# Patient Record
Sex: MALE | Race: WHITE | NOT HISPANIC OR LATINO | Employment: FULL TIME | URBAN - METROPOLITAN AREA
[De-identification: names, ages, dates, MRNs, and addresses within clinical notes are randomized per-mention and may not be internally consistent; named-entity substitution may affect disease eponyms.]

---

## 2024-05-28 ENCOUNTER — OFFICE VISIT (OUTPATIENT)
Dept: NEUROSURGERY | Facility: CLINIC | Age: 59
End: 2024-05-28
Payer: COMMERCIAL

## 2024-05-28 VITALS
RESPIRATION RATE: 18 BRPM | DIASTOLIC BLOOD PRESSURE: 82 MMHG | BODY MASS INDEX: 29.02 KG/M2 | HEART RATE: 75 BPM | WEIGHT: 174.2 LBS | TEMPERATURE: 98.4 F | HEIGHT: 65 IN | SYSTOLIC BLOOD PRESSURE: 126 MMHG

## 2024-05-28 DIAGNOSIS — S39.012S LUMBAR SPINE STRAIN, SEQUELA: ICD-10-CM

## 2024-05-28 DIAGNOSIS — M47.816 LUMBAR SPONDYLOSIS: Primary | ICD-10-CM

## 2024-05-28 DIAGNOSIS — M47.12 CERVICAL SPONDYLOSIS WITH MYELOPATHY: ICD-10-CM

## 2024-05-28 PROCEDURE — 99204 OFFICE O/P NEW MOD 45 MIN: CPT | Performed by: NEUROLOGICAL SURGERY

## 2024-05-28 RX ORDER — ESOMEPRAZOLE MAGNESIUM 40 MG/1
40 CAPSULE, DELAYED RELEASE ORAL
COMMUNITY

## 2024-05-28 RX ORDER — CYCLOBENZAPRINE HCL 10 MG
10 TABLET ORAL 2 TIMES DAILY PRN
COMMUNITY
Start: 2022-10-17

## 2024-05-28 RX ORDER — BISMUTH SUBSALICYLATE 262 MG
1 TABLET,CHEWABLE ORAL DAILY
COMMUNITY

## 2024-05-28 RX ORDER — ACETAMINOPHEN 500 MG
2000 TABLET ORAL DAILY
COMMUNITY

## 2024-05-28 RX ORDER — ALBUTEROL SULFATE 90 UG/1
2 AEROSOL, METERED RESPIRATORY (INHALATION) EVERY 6 HOURS PRN
COMMUNITY
Start: 2023-03-12

## 2024-05-28 RX ORDER — SIMVASTATIN 20 MG/1
20 TABLET, FILM COATED ORAL NIGHTLY
COMMUNITY

## 2024-05-28 RX ORDER — PREDNISONE 20 MG/1
20 TABLET ORAL DAILY PRN
COMMUNITY
Start: 2022-12-08

## 2024-05-28 RX ORDER — MICONAZOLE NITRATE 2 %
2 CREAM (GRAM) TOPICAL DAILY
COMMUNITY
Start: 2024-03-11

## 2024-05-28 RX ORDER — CHOLECALCIFEROL (VITAMIN D3) 25 MCG
1000 TABLET ORAL DAILY
COMMUNITY
Start: 2023-12-08

## 2024-05-28 RX ORDER — HYDROCHLOROTHIAZIDE 12.5 MG/1
12.5 CAPSULE ORAL
COMMUNITY

## 2024-05-28 RX ORDER — POTASSIUM CHLORIDE 750 MG/1
10 TABLET, EXTENDED RELEASE ORAL DAILY
COMMUNITY
Start: 2022-12-22

## 2024-05-28 ASSESSMENT — PAIN SCALES - GENERAL: PAINLEVEL: 5

## 2024-05-28 NOTE — PROGRESS NOTES
[facility] [place]  Department of Neurological Surgery  New Patient Visit    History of Present Illness:  Juan Cedeno is a 58 y.o. year old male who presents to the spine clinic with complaints of back pain especially on the right side extending from the interscapular area down to his low back.  He also has bilateral SI joint tenderness and pain.  He has significant numbness on the lateral aspect of his right thigh.  This started after his left hip replacement.  Initially the lateral thigh was painful and they talked about it being related to his intertrochanteric ligament but it is now just numb.  It seems to follow the fascia lotta very closely.  He denies loss of bowel or bladder function.  He is not having any neck pain.  He did have 2 artificial disks placed at C5-6 and C6-7 in 2020.  He is not complaining of any pain in his neck since.  He does however noticed that he is having trouble with fine motor coordination with his hands.  He has not fallen.  He had his first injection in his low back and he is not sure its helped yet.    Prior Spine Surgeries: Two-level arthroplasty in the cervical spine    Physical Therapy: Yes but mostly be focused on his hip and only a little bit on the low back pain.  We can consider more physical therapy or his primary doctor candidate since he is an hour and a half out of town.  Diabetic: No  Osteoporosis: No  Patient's BMI is Body mass index is 28.99 kg/m².    14/14 systems reviewed and negative other than what is listed in the history of present illness    Patient Active Problem List   Diagnosis    Lumbar spondylosis    Lumbar spine strain, sequela    Cervical spondylosis with myelopathy     No past medical history on file.  Past Surgical History:   Procedure Laterality Date    CERVICAL DISC SURGERY  01/20/2020    c5-6-7 disc replacement     Social History     Tobacco Use    Smoking status: Every Day     Current packs/day: 1.00     Types: Cigarettes     Smokeless tobacco: Never   Substance Use Topics    Alcohol use: Never     family history includes Hypertension in his mother.    Current Outpatient Medications:     albuterol 90 mcg/actuation inhaler, Inhale 2 puffs every 6 hours if needed., Disp: , Rfl:     cholecalciferol (Vitamin D-3) 25 MCG (1000 UT) tablet, Take 1 tablet (1,000 Units) by mouth once daily., Disp: , Rfl:     cholecalciferol (Vitamin D3) 50 mcg (2,000 unit) capsule, Take 1 capsule (50 mcg) by mouth once daily., Disp: , Rfl:     cyclobenzaprine (Flexeril) 10 mg tablet, Take 1 tablet (10 mg) by mouth 2 times a day as needed., Disp: , Rfl:     esomeprazole (NexIUM) 40 mg DR capsule, Take 1 capsule (40 mg) by mouth once daily in the morning. Take before meals., Disp: , Rfl:     hydroCHLOROthiazide (Microzide) 12.5 mg capsule, Take 1 capsule (12.5 mg) by mouth once daily., Disp: , Rfl:     multivitamin tablet, Take 1 tablet by mouth once daily., Disp: , Rfl:     nicotine polacrilex (Nicorette) 2 mg gum, Chew 1 each (2 mg) once daily., Disp: , Rfl:     potassium chloride CR 10 mEq ER tablet, Take 1 tablet (10 mEq) by mouth once daily., Disp: , Rfl:     predniSONE (Deltasone) 20 mg tablet, Take 1 tablet (20 mg) by mouth once daily as needed., Disp: , Rfl:     simvastatin (Zocor) 20 mg tablet, Take 1 tablet (20 mg) by mouth once daily at bedtime., Disp: , Rfl:   Allergies   Allergen Reactions    Tramadol Hives and Swelling     Tongue swelling        Physical Examination      General: NAD, AOx 3,  no aphasia or dysarthria, normal fund of knowledge  Cranial Nerves II-XII: VFF, PERRL, EOMI, Face Symm, Facial SILT, Palate/Tongue midline and symmetric  Motor: 5/5 Throughout all extremities,  No drift, no dysmetria on finger to nose, Dupuytren's contraction on his right hand for the fifth finger and developing in the fourth finger  Sensation: SILT and PP throughout all extremities except on the lateral aspect of his right thigh especially up by the  trochanter  DTRS: 2+ Throughout, right Hoffmans negative on the left no clonus  Gait is nonantalgic he could toe and heel walk without difficulty he has a negative Romberg    Results    I personally reviewed and interpreted the imaging results which included the MRI of his lumbar spine which shows some degenerative changes at the lower levels with some early foraminal stenosis but no canal compromise whatsoever.  Excellent alignment.    Assessment and Plan:    Juan Cedeno is a 58 y.o. year old male who presents to the spine clinic with significant low back pain with some right leg numbness.  He is status post two-level arthroplasty.  I think he shows some signs of myelopathy.  I think we should get plain x-rays of his cervical spine and we should follow it up with another MRI since he is tells me he has not had any studies since his cervical surgery.  He is willing to come back and talk to me further about these results.      I have reviewed all prior documentation and reviewed the electronic medical record since admission. I have personally have reviewed all advanced imaging not just the reports and used my interpretation as documented as the relevant findings. I have reviewed the risks and benefits of all treatment recommendations listed in this note with the patient and family. I spent a total of 45 minutes in service to this patient's care during this date of service.      The above clinical summary has been dictated with voice recognition software. It has not been proofread for grammatical errors, typographical mistakes, or other semantic inconsistencies.    Thank you for visiting our office today. It was our pleasure to take part in your healthcare.     Do not hesitate to call with any questions regarding your plan of care after leaving. My office can be reached at [phone] M-F 8am-4pm.     To clinicians, thank you very much for this kind referral. It is a privilege to partner with you in the care of your  patients. My office would be delighted to assist you with any further consultations or with questions regarding the plan of care outlined. Do not hesitate to call the office or contact me directly.     Sincerely,    Ephraim Do MD, FAANS, FACS  Board Certified Neurological Surgeon  , Department of Neurological Surgery  [facility] [employer]    [facility]  [address]  [facility] [address]  [address]  [address] [facility]  [address] [place] [address]    Phone: [phone]  Fax: [phone]

## 2024-06-15 ENCOUNTER — HOSPITAL ENCOUNTER (OUTPATIENT)
Dept: RADIOLOGY | Facility: HOSPITAL | Age: 59
Discharge: HOME | End: 2024-06-15
Payer: COMMERCIAL

## 2024-06-15 DIAGNOSIS — M47.12 CERVICAL SPONDYLOSIS WITH MYELOPATHY: ICD-10-CM

## 2024-06-15 PROCEDURE — 72141 MRI NECK SPINE W/O DYE: CPT | Performed by: RADIOLOGY

## 2024-06-15 PROCEDURE — 72052 X-RAY EXAM NECK SPINE 6/>VWS: CPT | Performed by: RADIOLOGY

## 2024-06-15 PROCEDURE — 72141 MRI NECK SPINE W/O DYE: CPT

## 2024-06-15 PROCEDURE — 72052 X-RAY EXAM NECK SPINE 6/>VWS: CPT

## 2024-07-03 ENCOUNTER — APPOINTMENT (OUTPATIENT)
Dept: NEUROSURGERY | Facility: CLINIC | Age: 59
End: 2024-07-03
Payer: COMMERCIAL

## 2024-07-03 VITALS
WEIGHT: 174 LBS | HEART RATE: 74 BPM | SYSTOLIC BLOOD PRESSURE: 158 MMHG | HEIGHT: 65 IN | TEMPERATURE: 98.2 F | DIASTOLIC BLOOD PRESSURE: 88 MMHG | BODY MASS INDEX: 28.99 KG/M2

## 2024-07-03 DIAGNOSIS — S13.100S: ICD-10-CM

## 2024-07-03 DIAGNOSIS — M47.12 CERVICAL SPONDYLOSIS WITH MYELOPATHY: Primary | ICD-10-CM

## 2024-07-03 PROCEDURE — 99214 OFFICE O/P EST MOD 30 MIN: CPT | Performed by: NEUROLOGICAL SURGERY

## 2024-07-03 ASSESSMENT — PATIENT HEALTH QUESTIONNAIRE - PHQ9
SUM OF ALL RESPONSES TO PHQ9 QUESTIONS 1 & 2: 0
2. FEELING DOWN, DEPRESSED OR HOPELESS: NOT AT ALL
1. LITTLE INTEREST OR PLEASURE IN DOING THINGS: NOT AT ALL

## 2024-07-03 ASSESSMENT — PAIN SCALES - GENERAL: PAINLEVEL: 5

## 2024-07-03 NOTE — PROGRESS NOTES
[facility] [place]  Department of Neurological Surgery  Established Patient Visit    History of Present Illness  Juan Cedeno is a 58 y.o. year old male who presents to the spine clinic in follow up with his new MRI of the cervical spine.  He is having some trouble since the last time I saw him with some incontinence of stool.  It does not sound like it is related to his spine it sounds like he might have a GI issue.  But what is concerning is that he is having some cramping and tremulousness in his hands when he tries to work with them for example on computer cables and the like.  I showed him his MRI and we also went over the flexion-extension cervical x-rays.  The MRI shows that there is a disc osteophyte complex at the C4-5 level causing some effacement of the cord and some stenosis of the foramen at that level.  The 2 levels of arthroplasty show some artifact and it makes it difficult to see the the extent of cord effacement but I can see that the foramen on the right side are more significantly affected than the left side at each level.  If and when he gets a point where he cannot take it any longer I think he is going to need a 3 level operation.  The x-rays show that he has hypermobility at the C5-6 level and what looks like almost no movement whatsoever at the C6-7 level.  I told him that I would like to get a CAT scan of his cervical spine and bring him back with his wife to go over the recommendation.  I talked him today about a 3 level operation I would go through the old incision.  I would remove the C5-6 and the C6-7 arthroplasty.  I would then perform a 3 level anterior cervical disc excision with interbody fusion and plate fixation at C4-5, C5-6, and C6-7.  I went over the operation with him in detail. We discussed risks,(these include but are not limited to - bleeding, infection, paralysis, muscle weakness, CSF leak, bowel or bladder dysfunction, incomplete resolution of pain or  numbness, DVT/PE, heart attack, stroke, and other unforeseen medical and anesthesia complications) benefits, and outcome possibilities as well as the alternatives to this form of therapy. He understands and would like to proceed.    Patient's BMI is Body mass index is 28.96 kg/m².    14/14 systems reviewed and negative other than what is listed in the history of present illness    Patient Active Problem List   Diagnosis    Lumbar spondylosis    Lumbar spine strain, sequela    Cervical spondylosis with myelopathy    Cervical subluxation, sequela     No past medical history on file.  Past Surgical History:   Procedure Laterality Date    CERVICAL DISC SURGERY  01/20/2020    c5-6-7 disc replacement     Social History     Tobacco Use    Smoking status: Every Day     Current packs/day: 1.00     Types: Cigarettes    Smokeless tobacco: Never   Substance Use Topics    Alcohol use: Never     family history includes Hypertension in his mother.    Current Outpatient Medications:     albuterol 90 mcg/actuation inhaler, Inhale 2 puffs every 6 hours if needed., Disp: , Rfl:     cholecalciferol (Vitamin D-3) 25 MCG (1000 UT) tablet, Take 1 tablet (1,000 Units) by mouth once daily., Disp: , Rfl:     cholecalciferol (Vitamin D3) 50 mcg (2,000 unit) capsule, Take 1 capsule (50 mcg) by mouth once daily., Disp: , Rfl:     cyclobenzaprine (Flexeril) 10 mg tablet, Take 1 tablet (10 mg) by mouth 2 times a day as needed., Disp: , Rfl:     esomeprazole (NexIUM) 40 mg DR capsule, Take 1 capsule (40 mg) by mouth once daily in the morning. Take before meals., Disp: , Rfl:     hydroCHLOROthiazide (Microzide) 12.5 mg capsule, Take 1 capsule (12.5 mg) by mouth once daily., Disp: , Rfl:     multivitamin tablet, Take 1 tablet by mouth once daily., Disp: , Rfl:     nicotine polacrilex (Nicorette) 2 mg gum, Chew 1 each (2 mg) once daily., Disp: , Rfl:     potassium chloride CR 10 mEq ER tablet, Take 1 tablet (10 mEq) by mouth once daily., Disp: , Rfl:      predniSONE (Deltasone) 20 mg tablet, Take 1 tablet (20 mg) by mouth once daily as needed., Disp: , Rfl:     simvastatin (Zocor) 20 mg tablet, Take 1 tablet (20 mg) by mouth once daily at bedtime., Disp: , Rfl:   Allergies   Allergen Reactions    Tramadol Hives and Swelling     Tongue swelling          Results:  I personally reviewed and interpreted the imaging results which included the plain x-rays including flexion and extension views of his neck and the new MRI of the cervical spine are described above.    Assessment and Plan:      Juan Cedeno is a 58 y.o. year old male who presents to the spine clinic in follow up with his new MRI of the cervical spine.  He is having some trouble since the last time I saw him with some incontinence of stool.  It does not sound like it is related to his spine it sounds like he might have a GI issue.  But what is concerning is that he is having some cramping and tremulousness in his hands when he tries to work with them for example on computer cables and the like.  I showed him his MRI and we also went over the flexion-extension cervical x-rays.  The MRI shows that there is a disc osteophyte complex at the C4-5 level causing some effacement of the cord and some stenosis of the foramen at that level.  The 2 levels of arthroplasty show some artifact and it makes it difficult to see the the extent of cord effacement but I can see that the foramen on the right side are more significantly affected than the left side at each level.  If and when he gets a point where he cannot take it any longer I think he is going to need a 3 level operation.  The x-rays show that he has hypermobility at the C5-6 level and what looks like almost no movement whatsoever at the C6-7 level.  I told him that I would like to get a CAT scan of his cervical spine and bring him back with his wife to go over the recommendation.  I talked him today about a 3 level operation I would go through the old incision.   I would remove the C5-6 and the C6-7 arthroplasty.  I would then perform a 3 level anterior cervical disc excision with interbody fusion and plate fixation at C4-5, C5-6, and C6-7.  I went over the operation with him in detail. We discussed risks,(these include but are not limited to - bleeding, infection, paralysis, muscle weakness, CSF leak, bowel or bladder dysfunction, incomplete resolution of pain or numbness, DVT/PE, heart attack, stroke, and other unforeseen medical and anesthesia complications) benefits, and outcome possibilities as well as the alternatives to this form of therapy. He understands and would like to proceed.    He tells me that this is affecting his life overall.  He is concerned that he is not going to be able to enjoy his halfway because he does not have the stamina to do any activities and he cannot function with his hands and his arms.  I suggested that he start getting second and even third opinions and again bring his wife in for more discussions.    I have reviewed all prior documentation and reviewed the electronic medical record since admission. I have personally have reviewed all advanced imaging not just the reports and used my interpretation as documented as the relevant findings. I have reviewed the risks and benefits of all treatment recommendations listed in this note with the patient and family. I spent a total of 30 minutes in service to this patient's care during this date of service.      The above clinical summary has been dictated with voice recognition software. It has not been proofread for grammatical errors, typographical mistakes, or other semantic inconsistencies.    Thank you for visiting our office today. It was our pleasure to take part in your healthcare.     Do not hesitate to call with any questions regarding your plan of care after leaving. My office can be reached at [phone] M-F 8am-4pm.     To clinicians, thank you very much for this kind referral.  It is a privilege to partner with you in the care of your patients. My office would be delighted to assist you with any further consultations or with questions regarding the plan of care outlined. Do not hesitate to call the office or contact me directly.     Sincerely,    Ephraim Do MD, FAANS, FACS  Board Certified Neurological Surgeon  , Department of Neurological Surgery  [facility] [employer] [facility]    [facility]  [address]  [facility] [address]  [address]  [address]  [address] [place] [address]    Phone: [phone]  Fax: [phone]

## 2024-07-26 ENCOUNTER — HOSPITAL ENCOUNTER (OUTPATIENT)
Dept: RADIOLOGY | Facility: HOSPITAL | Age: 59
Discharge: HOME | End: 2024-07-26
Payer: COMMERCIAL

## 2024-07-26 DIAGNOSIS — M47.12 CERVICAL SPONDYLOSIS WITH MYELOPATHY: ICD-10-CM

## 2024-07-26 DIAGNOSIS — S13.100S: ICD-10-CM

## 2024-07-26 PROCEDURE — 72125 CT NECK SPINE W/O DYE: CPT

## 2024-08-02 ENCOUNTER — APPOINTMENT (OUTPATIENT)
Dept: NEUROSURGERY | Facility: CLINIC | Age: 59
End: 2024-08-02
Payer: COMMERCIAL

## 2024-08-02 VITALS
TEMPERATURE: 98.2 F | BODY MASS INDEX: 29.46 KG/M2 | DIASTOLIC BLOOD PRESSURE: 76 MMHG | SYSTOLIC BLOOD PRESSURE: 138 MMHG | HEIGHT: 65 IN | RESPIRATION RATE: 18 BRPM | HEART RATE: 71 BPM | WEIGHT: 176.8 LBS

## 2024-08-02 DIAGNOSIS — M47.12 CERVICAL SPONDYLOSIS WITH MYELOPATHY: Primary | ICD-10-CM

## 2024-08-02 DIAGNOSIS — S13.100S: ICD-10-CM

## 2024-08-02 PROCEDURE — 99215 OFFICE O/P EST HI 40 MIN: CPT | Performed by: NEUROLOGICAL SURGERY

## 2024-08-02 PROCEDURE — 3008F BODY MASS INDEX DOCD: CPT | Performed by: NEUROLOGICAL SURGERY

## 2024-08-02 ASSESSMENT — PAIN SCALES - GENERAL: PAINLEVEL: 8

## 2024-08-02 NOTE — PROGRESS NOTES
[facility] [place]  Department of Neurological Surgery  Established Patient Visit    History of Present Illness  Juan Cedeno is a 58 y.o. year old male who presents to the spine clinic in follow up with his wife to review the CAT scan and his x-rays and the MRI all of his cervical spine.  At his last visit I talked to him about the hypermobility that exist in his cervical spine especially at C5-6 and the persistent foraminal stenosis that is present at C6-7 and is osteophyte formation at C4-5.  If and when the patient gets to the point where he cannot take it any longer and wants to get rid of some of his neck and arm pain and symptoms I am recommending that I ask my ENT colleague Dr. Keagan Leiva to help me exposed through the old incision his anterior cervical spine.  We would then remove the 2 artificial disks and performed cervical discectomies with interbody fusions at those 2 levels of C5-6 and C6-7 but then we would also do an anterior cervical disc excision with anterior interbody fusion and plate fixation at C4-5 as well.  The final construct will have a plate spanning from C4-C7.  I went over the operation with him in detail. We discussed risks,(these include but are not limited to - bleeding, infection, paralysis, muscle weakness, CSF leak, bowel or bladder dysfunction, incomplete resolution of pain or numbness, DVT/PE, heart attack, stroke, and other unforeseen medical and anesthesia complications) benefits, and outcome possibilities as well as the alternatives to this form of therapy. He understands and would like to discuss the situation with his wife and he will be in touch with us if and when he wants to schedule a date.    Patient's BMI is Body mass index is 29.42 kg/m².    14/14 systems reviewed and negative other than what is listed in the history of present illness    Patient Active Problem List   Diagnosis    Lumbar spondylosis    Lumbar spine strain, sequela    Cervical spondylosis  with myelopathy    Cervical subluxation, sequela     No past medical history on file.  Past Surgical History:   Procedure Laterality Date    CERVICAL DISC SURGERY  01/20/2020    c5-6-7 disc replacement     Social History     Tobacco Use    Smoking status: Every Day     Current packs/day: 1.00     Types: Cigarettes    Smokeless tobacco: Never   Substance Use Topics    Alcohol use: Never     family history includes Hypertension in his mother.    Current Outpatient Medications:     albuterol 90 mcg/actuation inhaler, Inhale 2 puffs every 6 hours if needed., Disp: , Rfl:     cholecalciferol (Vitamin D-3) 25 MCG (1000 UT) tablet, Take 1 tablet (1,000 Units) by mouth once daily., Disp: , Rfl:     cholecalciferol (Vitamin D3) 50 mcg (2,000 unit) capsule, Take 1 capsule (50 mcg) by mouth once daily., Disp: , Rfl:     cyclobenzaprine (Flexeril) 10 mg tablet, Take 1 tablet (10 mg) by mouth 2 times a day as needed., Disp: , Rfl:     esomeprazole (NexIUM) 40 mg DR capsule, Take 1 capsule (40 mg) by mouth once daily in the morning. Take before meals., Disp: , Rfl:     hydroCHLOROthiazide (Microzide) 12.5 mg capsule, Take 1 capsule (12.5 mg) by mouth once daily., Disp: , Rfl:     multivitamin tablet, Take 1 tablet by mouth once daily., Disp: , Rfl:     nicotine polacrilex (Nicorette) 2 mg gum, Chew 1 each (2 mg) once daily., Disp: , Rfl:     potassium chloride CR 10 mEq ER tablet, Take 1 tablet (10 mEq) by mouth once daily., Disp: , Rfl:     predniSONE (Deltasone) 20 mg tablet, Take 1 tablet (20 mg) by mouth once daily as needed., Disp: , Rfl:     simvastatin (Zocor) 20 mg tablet, Take 1 tablet (20 mg) by mouth once daily at bedtime., Disp: , Rfl:   Allergies   Allergen Reactions    Tramadol Hives and Swelling     Tongue swelling            Results:  I personally reviewed and interpreted the imaging results which included CAT scan and MRI of the cervical spine as well as the plain x-rays.  These are described above.    Assessment  and Plan:      Juan Cedeno is a 58 y.o. year old male who presents to the spine clinic in follow up with his wife to review the CAT scan and his x-rays and the MRI all of his cervical spine.  At his last visit I talked to him about the hypermobility that exist in his cervical spine especially at C5-6 and the persistent foraminal stenosis that is present at C6-7 and is osteophyte formation at C4-5.  If and when the patient gets to the point where he cannot take it any longer and wants to get rid of some of his neck and arm pain and symptoms I am recommending that I ask my ENT colleague Dr. Keagan Leiva to help me exposed through the old incision his anterior cervical spine.  We would then remove the 2 artificial disks and performed cervical discectomies with interbody fusions at those 2 levels of C5-6 and C6-7 but then we would also do an anterior cervical disc excision with anterior interbody fusion and plate fixation at C4-5 as well.  The final construct will have a plate spanning from C4-C7.  I went over the operation with him in detail. We discussed risks,(these include but are not limited to - bleeding, infection, paralysis, muscle weakness, CSF leak, bowel or bladder dysfunction, incomplete resolution of pain or numbness, DVT/PE, heart attack, stroke, and other unforeseen medical and anesthesia complications) benefits, and outcome possibilities as well as the alternatives to this form of therapy. He understands and would like to discuss the situation with his wife and he will be in touch with us if and when he wants to schedule a date.      I have reviewed all prior documentation and reviewed the electronic medical record since admission. I have personally have reviewed all advanced imaging not just the reports and used my interpretation as documented as the relevant findings. I have reviewed the risks and benefits of all treatment recommendations listed in this note with the patient and family. I spent a total of  40 minutes in service to this patient's care during this date of service.      The above clinical summary has been dictated with voice recognition software. It has not been proofread for grammatical errors, typographical mistakes, or other semantic inconsistencies.    Thank you for visiting our office today. It was our pleasure to take part in your healthcare.     Do not hesitate to call with any questions regarding your plan of care after leaving. My office can be reached at [phone] M-F 8am-4pm.     To clinicians, thank you very much for this kind referral. It is a privilege to partner with you in the care of your patients. My office would be delighted to assist you with any further consultations or with questions regarding the plan of care outlined. Do not hesitate to call the office or contact me directly.     Sincerely,    Ephraim Do MD, FAANS, FACS  Board Certified Neurological Surgeon  , Department of Neurological Surgery  [facility]    [facility]  [address]  [facility] [address]  [place] [id number]    [facility]  [address]  [address]  [place] [address]    Phone: [phone]  Fax: [phone]

## 2025-01-03 ENCOUNTER — APPOINTMENT (OUTPATIENT)
Dept: NEUROSURGERY | Facility: CLINIC | Age: 60
End: 2025-01-03
Payer: COMMERCIAL